# Patient Record
Sex: MALE | Race: WHITE | NOT HISPANIC OR LATINO | Employment: OTHER | URBAN - METROPOLITAN AREA
[De-identification: names, ages, dates, MRNs, and addresses within clinical notes are randomized per-mention and may not be internally consistent; named-entity substitution may affect disease eponyms.]

---

## 2022-04-11 NOTE — PROGRESS NOTES
Pt Name:  Donny Proctor  Pt :  1950  Pt MRN:  [id number]    Date: 2022    Reason for visit:     Follow up visit for Chronic Kidney Disease.    Serum creatinine 1.78, GFR 37, CKD stage 3b.    Chief Complaint:     The patient denies any complaints today.      Assessment & Plan:      Problem #1. Acute Kidney Injury of Uncertain Etiology    Assessment:    Unless, as discussed during his 1st visit, associated with the treatment for his malignancy, or occurring as a rare immunologic renal process and complication of his underlying malignancy.    There does remain some possibility that the hypercalcemia was participatory.       Plan:    Conservative, non-dialysis, nephrologic managements.    Continue to emphasize the intake of at least 72 oz of fluid a day to avoid the development of dehydration and further decline in kidney function as a consequence.    As discussed with Dr. Maier today by telephone, this provider will standby from a Nephrology standpoint is he and the patient press on to provide the patient with more chemotherapy if that is considered indicated by Dr. Maier.    Return for follow-up in 8 weeks with repeat kidney lab work  done before the visit.     Problem #2. Type II Diabetes Mellitus     Assessment:     Reasonably controlled with A1c 7.3%.   Plan:   Diabetes managements and nutritional support as per his primary care provider, Dr. Les Caban.    Repeat A1c from time to time is indicated.     Problem #3. Chronic Kidney Disease Stage IIIa-b     Assessment:    That appears to have been present prior to the onset of the acute kidney injury noted more recently.     Plan:    As in the plan for Problem #1.      Problem #4. Persistent Proteinuria    Assessment:    That may be an effect of the diabetes mellitus.   Plan:    Repeat random urine protein/creatinine from time to time as indicated.     Problem #5.  Hypomagnesemia    Assessment:    Presumed a side effect of the  cetuximab.    Resolved     Plan:    Magnesium oxide 800 mg by mouth daily.     Repeat serum magnesium prior to the visit in 8 weeks.        Problem #6. Hypercalcemia    Assessment:    Resolved    Plan:    Repeat serum calcium prior to that visit in 8 weeks.           HPI:    This is the 2nd [facility] visit for this 72-year-old man referred by Dr. 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     At the time of the first visit, the patient was able to provide specific history (using his [employer] whiteboard) that he had been using NSAIDs, either prescribed or OTC.  He also had not had any intercurrent illnesses over the preceding couple of months.  That he was aware, he has been  COVID-19 test negative, and has received all of his COVID-19  vaccines.     In the clinic today for follow-up of the status of her kidney function, he does not have absent appetite, nausea, chest pain, shortness of breath, lying flat to sleep at night, absent energy, swelling, or any trouble thinking.      From the standpoint of the risk factors for the development of a kidney function problem, the patient has pre-existing chronic kidney disease probably secondary to diabetes, but has had superimposed on that effects from other health problems, perhaps his chemotherapy.      History:     Past Medical History:   Diagnosis Date    Cancer     Larynx    Coronary artery disease     Diverticulosis     Essential (primary) hypertension     External hemorrhoids     History of thyroid disease     Hypercholesteremia     Neuropathy     Personal history of colonic polyps     Rheumatoid arthritis, unspecified      Past Surgical History:   Procedure Laterality Date     FEMORAL PTA/STENT        GASTROSTOMY W/ FEEDING TUBE       INSERT / REPLACE / REMOVE PACEMAKER      CARDIOVASCULAR STRESS TEST      COLONOSCOPY      CORONARY ANGIOPLASTY WITH STENT PLACEMENT      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  Social History     Tobacco Use   Smoking Status Former Smoker    Packs/day: 1.00   Smokeless Tobacco Never Used       Allergies:    Review of patient's allergies indicates:  No Known Allergies      Current Outpatient Medications:     aspirin (ECOTRIN) 81 MG EC tablet, Take 81 mg by mouth once daily., Disp: , Rfl:     calcitrioL (ROCALTROL) 1 mcg/mL solution, Take 0.5 mcg by mouth once daily., Disp: , Rfl:     EScitalopram oxalate (LEXAPRO) 10 MG tablet, Take 10 mg by mouth once daily., Disp: , Rfl:     gabapentin (NEURONTIN) 600 MG tablet, Take 600 mg by mouth 3 (three) times daily., Disp: , Rfl:     HYDROcodone-acetaminophen (NORCO) 5-325 mg per tablet, Take 1 tablet by mouth every 6 (six) hours as needed., Disp: , Rfl:     levothyroxine (SYNTHROID, LEVOTHROID) 175 MCG tablet, Take 175 mcg by mouth once daily., Disp: , Rfl:     magnesium oxide (MAG-OX) 400 mg (241.3 mg magnesium) tablet, Take 1 tablet by mouth once daily., Disp: , Rfl:     omeprazole (PRILOSEC) 40 MG capsule, Take 40 mg by mouth once daily., Disp: , Rfl:     rosuvastatin (CRESTOR) 10 MG tablet, Take 10 mg by mouth once daily., Disp: , Rfl:     triamcinolone  (KENALOG) 0.5 % ointment, Apply topically 2 (two) times daily., Disp: , Rfl:     ROS:     Constitutional:  Denies fever or chills   Eyes:  Denies change in visual acuity   HENT:  Denies nasal congestion or sore throat   Respiratory:  As in the history of the present illness.   Cardiovascular:  As in the history of the present illness.   GI:  As in the history of the present illness.    Musculoskeletal:  Denies back pain or joint pain   Integument:  Denies rash   Neurologic:  Denies headache, focal weakness or sensory changes   Endocrine:  Denies polyuria or polydipsia   Lymphatic:  Denies swollen glands   Psychiatric:  Denies depression or anxiety      Physical Exam:     Vitals:   Vitals:    04/12/22 1619   BP: 100/65   Pulse: 78   SpO2: 99%   Weight: 82.6 kg (182 lb)   Height: 6' (1.829 m)     Body mass index is 24.68 kg/m².    Constitutional:  Well developed, well nourished, and in no acute distress   Eyes:  PERRLA, conjunctiva normal   HENT:  Atraumatic, external ears normal, nose normal.  Neck: There is no jugular venous distension or thyromegaly.   Respiratory:  No respiratory distress, normal breath sounds, no rales, no wheezing   Cardiovascular:  Normal rate,and a regular rhythm, no murmurs, no gallops, no rub, and no edema.  GI:  Normal bowel sounds.  Musculoskeletal:  No deformities.   Neurologic:  Alert & oriented x 3, CN 2-12 normal, normal motor function, and no asterixis.   Psychiatric:  Speech and behavior appropriate.      Labs/Tests:    Lab Results   Component Value Date    K 4.1 10/20/2021    GLUCOSE Negative 03/23/2022    HGB 11.8 (L) 04/11/2022    HGBA1C 7.3 (H) 03/24/2022    TRIG 62 10/21/2021    CHOL 260 (H) 10/21/2021    HDL 70 (H) 10/21/2021    LDLCALC 178 (H) 10/21/2021    LABVLDL 12 10/21/2021         Follow Up:     Return for follow-up in 8 weeks with repeat kidney lab work done before the visit.      This note was created using the voice recognition software currently available to the  Medical Staff of the [facility] and its health care facilities. All of the best efforts undertaken to edit the product of that use shall necessarily fall short from time to time. Viewers and reviewers of the product of its use are encouraged to contact this provider for clarification when, and if, the product message is unclear.

## 2022-04-12 ENCOUNTER — OFFICE VISIT (OUTPATIENT)
Dept: NEPHROLOGY | Facility: CLINIC | Age: 72
End: 2022-04-12
Payer: COMMERCIAL

## 2022-04-12 VITALS
DIASTOLIC BLOOD PRESSURE: 65 MMHG | OXYGEN SATURATION: 99 % | SYSTOLIC BLOOD PRESSURE: 100 MMHG | BODY MASS INDEX: 24.65 KG/M2 | HEIGHT: 72 IN | HEART RATE: 78 BPM | WEIGHT: 182 LBS

## 2022-04-12 DIAGNOSIS — N17.9 ACUTE KIDNEY INJURY: Primary | ICD-10-CM

## 2022-04-12 DIAGNOSIS — N18.31 TYPE 2 DIABETES MELLITUS WITH STAGE 3A CHRONIC KIDNEY DISEASE, WITHOUT LONG-TERM CURRENT USE OF INSULIN: ICD-10-CM

## 2022-04-12 DIAGNOSIS — E11.22 TYPE 2 DIABETES MELLITUS WITH STAGE 3A CHRONIC KIDNEY DISEASE, WITHOUT LONG-TERM CURRENT USE OF INSULIN: ICD-10-CM

## 2022-04-12 DIAGNOSIS — N18.32 STAGE 3B CHRONIC KIDNEY DISEASE: ICD-10-CM

## 2022-04-12 DIAGNOSIS — E83.52 HYPERCALCEMIA: ICD-10-CM

## 2022-04-12 DIAGNOSIS — R80.1 PERSISTENT PROTEINURIA: ICD-10-CM

## 2022-04-12 DIAGNOSIS — E83.42 HYPOMAGNESEMIA: ICD-10-CM

## 2022-04-12 PROBLEM — E11.29 TYPE 2 DIABETES MELLITUS WITH RENAL COMPLICATION: Status: ACTIVE | Noted: 2022-04-12

## 2022-04-12 PROCEDURE — 99215 OFFICE O/P EST HI 40 MIN: CPT | Mod: ,,, | Performed by: INTERNAL MEDICINE

## 2022-04-12 PROCEDURE — 99215 PR OFFICE/OUTPT VISIT, EST, LEVL V, 40-54 MIN: ICD-10-PCS | Mod: ,,, | Performed by: INTERNAL MEDICINE

## 2022-04-12 RX ORDER — ASPIRIN 81 MG/1
81 TABLET ORAL DAILY
COMMUNITY
Start: 2022-01-25

## 2022-04-12 RX ORDER — OMEPRAZOLE 40 MG/1
40 CAPSULE, DELAYED RELEASE ORAL DAILY
COMMUNITY
Start: 2021-12-20

## 2022-04-12 RX ORDER — GABAPENTIN 600 MG/1
600 TABLET ORAL 3 TIMES DAILY
COMMUNITY
Start: 2022-03-21

## 2022-04-12 RX ORDER — TRIAMCINOLONE ACETONIDE 5 MG/G
OINTMENT TOPICAL 2 TIMES DAILY
COMMUNITY
Start: 2022-03-16

## 2022-04-12 RX ORDER — LEVOTHYROXINE SODIUM 175 UG/1
175 TABLET ORAL DAILY
COMMUNITY
Start: 2022-01-25

## 2022-04-12 RX ORDER — DOXYCYCLINE 100 MG/1
100 CAPSULE ORAL DAILY
COMMUNITY
Start: 2022-03-16 | End: 2022-04-12 | Stop reason: ALTCHOICE

## 2022-04-12 RX ORDER — ROSUVASTATIN CALCIUM 10 MG/1
10 TABLET, COATED ORAL DAILY
COMMUNITY
Start: 2022-01-31

## 2022-04-12 RX ORDER — HYDROCODONE BITARTRATE AND ACETAMINOPHEN 5; 325 MG/1; MG/1
1 TABLET ORAL EVERY 6 HOURS PRN
COMMUNITY
Start: 2021-12-06

## 2022-04-12 RX ORDER — LANOLIN ALCOHOL/MO/W.PET/CERES
1 CREAM (GRAM) TOPICAL DAILY
COMMUNITY
Start: 2022-02-09

## 2022-04-12 RX ORDER — CALCITRIOL 1 UG/ML
0.5 SOLUTION ORAL DAILY
COMMUNITY
Start: 2022-02-25

## 2022-04-12 RX ORDER — ESCITALOPRAM OXALATE 10 MG/1
10 TABLET ORAL DAILY
COMMUNITY
Start: 2022-01-25

## 2022-04-12 NOTE — PATIENT INSTRUCTIONS
The patient has been provided with his current level of somewhat improved kidney function, and is asked to return for follow-up in 8 weeks with repeat kidney lab work done before the visit.    He continues to have recommended the intake of at least 72 oz of fluid a day to avoid development of dehydration and worsening kidney function as a consequence.    He is listed in today wall this provider was talking to Dr. Maier, his oncologist, regarding his visit with the patient tomorrow and/or whether or not chemotherapy can be restarted.  The conclusion of this patient Dr. Alvarez was the Nephrology will stand prepared to assist as time wears on, and especially as his kidney function is monitored every week once the chemotherapy has been restarted.